# Patient Record
Sex: FEMALE | Race: OTHER | HISPANIC OR LATINO
[De-identification: names, ages, dates, MRNs, and addresses within clinical notes are randomized per-mention and may not be internally consistent; named-entity substitution may affect disease eponyms.]

---

## 2024-03-11 PROBLEM — Z00.00 ENCOUNTER FOR PREVENTIVE HEALTH EXAMINATION: Status: ACTIVE | Noted: 2024-03-11

## 2024-03-14 ENCOUNTER — APPOINTMENT (OUTPATIENT)
Dept: OBGYN | Facility: CLINIC | Age: 39
End: 2024-03-14
Payer: COMMERCIAL

## 2024-03-14 ENCOUNTER — RESULT CHARGE (OUTPATIENT)
Age: 39
End: 2024-03-14

## 2024-03-14 VITALS
DIASTOLIC BLOOD PRESSURE: 80 MMHG | OXYGEN SATURATION: 98 % | HEIGHT: 64 IN | HEART RATE: 85 BPM | WEIGHT: 206 LBS | BODY MASS INDEX: 35.17 KG/M2 | SYSTOLIC BLOOD PRESSURE: 126 MMHG | TEMPERATURE: 98.1 F

## 2024-03-14 DIAGNOSIS — Z83.3 FAMILY HISTORY OF DIABETES MELLITUS: ICD-10-CM

## 2024-03-14 DIAGNOSIS — Z01.419 ENCOUNTER FOR GYNECOLOGICAL EXAMINATION (GENERAL) (ROUTINE) W/OUT ABNORMAL FINDINGS: ICD-10-CM

## 2024-03-14 DIAGNOSIS — Z78.9 OTHER SPECIFIED HEALTH STATUS: ICD-10-CM

## 2024-03-14 LAB
BILIRUB UR QL STRIP: NEGATIVE
CLARITY UR: NORMAL
COLLECTION METHOD: NORMAL
GLUCOSE UR-MCNC: NEGATIVE
HCG UR QL: 0.2 EU/DL
HGB UR QL STRIP.AUTO: NEGATIVE
KETONES UR-MCNC: NEGATIVE
LEUKOCYTE ESTERASE UR QL STRIP: NEGATIVE
NITRITE UR QL STRIP: NEGATIVE
PH UR STRIP: 6
PROT UR STRIP-MCNC: NEGATIVE
SP GR UR STRIP: 1.03

## 2024-03-14 PROCEDURE — 99385 PREV VISIT NEW AGE 18-39: CPT

## 2024-03-14 NOTE — PLAN
[FreeTextEntry1] : Routine GYN exam: Pap/HPV, cultures. Mammogram/breast US ordered. Pt declined bloodwork, does with PCP.  BSE and health maintenance topics reviewed.  RTO for results, as needed, and for annual GYN exam.

## 2024-03-14 NOTE — PHYSICAL EXAM
[Chaperone Present] : A chaperone was present in the examining room during all aspects of the physical examination [Appropriately responsive] : appropriately responsive [Alert] : alert [No Acute Distress] : no acute distress [Soft] : soft [Non-tender] : non-tender [No Lesions] : no lesions [No Mass] : no mass [Examination Of The Breasts] : a normal appearance [No Discharge] : no discharge [No Masses] : no breast masses were palpable [Labia Majora] : normal [Labia Minora] : normal [No Bleeding] : There was no active vaginal bleeding [Normal] : normal [Uterine Adnexae] : non-palpable

## 2024-03-14 NOTE — COUNSELING
[Nutrition/ Exercise/ Weight Management] : nutrition, exercise, weight management [Body Image] : body image [Vitamins/Supplements] : vitamins/supplements [Sunscreen] : sunscreen [Breast Self Exam] : breast self exam [Contraception/ Emergency Contraception/ Safe Sexual Practices] : contraception, emergency contraception, safe sexual practices [Confidentiality] : confidentiality

## 2024-03-14 NOTE — HISTORY OF PRESENT ILLNESS
[Yes] : pregnancy [FreeTextEntry1] : NEW PATIENT PRESENT FOR ROUTINE GYN EXAM, No complaints. Denies abnormal bleeding, abnormal discharge, pelvic/abdominal pain, dysuria. Reports regular monthly periods. LMP 24. Sexually active with one male partner, uses calendar  method + coitus interruptus and is satisfied with this method.  Hx: OB:  2003: 36-wk  : 36-wk C/S GYN: denies cysts, fibroids, STIs, + abnormal Pap and LEEP done in  (normal Paps since then) MED: anxiety, follows with therapist monthly MEDS: Xanax PRN, follows with therapist and states she is coping well SURG: C/S, LEEP ALLERGY: denies SOC: denies smoking, alcohol, illicit drug use  FAM: diabetes in PGM  [Patient reported PAP Smear was normal] : Patient reported PAP Smear was normal [Mammogramdate] : PT UNSURE [BreastSonogramDate] : -0- [PapSmeardate] : 2022 [BoneDensityDate] : -0- [ColonoscopyDate] : -0- [LMPDate] : 2/20/24 [TextBox_6] : 2/20/24 [No] : Patient does not have concerns regarding sex [Patient refuses STI testing] : Patient refuses STI testing [FreeTextEntry4] :  requests only vaginal/cervical STI testing with Pap.

## 2024-03-18 LAB
C TRACH RRNA SPEC QL NAA+PROBE: NOT DETECTED
HPV HIGH+LOW RISK DNA PNL CVX: NOT DETECTED
N GONORRHOEA RRNA SPEC QL NAA+PROBE: NOT DETECTED
SOURCE AMPLIFICATION: NORMAL
SOURCE AMPLIFICATION: NORMAL
T VAGINALIS RRNA SPEC QL NAA+PROBE: NOT DETECTED

## 2024-03-20 LAB — CYTOLOGY CVX/VAG DOC THIN PREP: NORMAL

## 2024-03-29 ENCOUNTER — TRANSCRIPTION ENCOUNTER (OUTPATIENT)
Age: 39
End: 2024-03-29